# Patient Record
Sex: MALE | Race: BLACK OR AFRICAN AMERICAN | Employment: STUDENT | URBAN - METROPOLITAN AREA
[De-identification: names, ages, dates, MRNs, and addresses within clinical notes are randomized per-mention and may not be internally consistent; named-entity substitution may affect disease eponyms.]

---

## 2018-02-08 ENCOUNTER — OFFICE VISIT (OUTPATIENT)
Dept: INTERNAL MEDICINE | Facility: CLINIC | Age: 23
End: 2018-02-08
Payer: COMMERCIAL

## 2018-02-08 VITALS
DIASTOLIC BLOOD PRESSURE: 60 MMHG | WEIGHT: 138.5 LBS | HEART RATE: 86 BPM | OXYGEN SATURATION: 98 % | RESPIRATION RATE: 20 BRPM | SYSTOLIC BLOOD PRESSURE: 93 MMHG

## 2018-02-08 DIAGNOSIS — R10.32 LLQ ABDOMINAL PAIN: ICD-10-CM

## 2018-02-08 DIAGNOSIS — K62.5 RECTAL BLEEDING: Primary | ICD-10-CM

## 2018-02-08 ASSESSMENT — PAIN SCALES - GENERAL: PAINLEVEL: NO PAIN (0)

## 2018-02-08 NOTE — TELEPHONE ENCOUNTER
APPT INFO    Date /Time: 2/22/18   Reason for Appt: Rectal bleeding   Ref Provider/Clinic: [facility]   Are there internal records? Yes/No?  IF YES, list clinic names: Yes;   [facility]   Are there outside records? Yes/No? No   Patient Contact (Y/N) & Call Details: No - cc called to let us know that pt has no outside records   Action:

## 2018-02-08 NOTE — PATIENT INSTRUCTIONS
[facility]: [phone]     [facility] Medication Refill Request Information:  * Please contact your pharmacy regarding ANY request for medication refills.  ** [facility] Prescription Fax = [phone]  * Please allow 3 business days for routine medication refills.  * Please allow 5 business days for controlled substance medication refills.     [facility] Test Result notification information:  *You will be notified with in 7-10 days of your appointment day regarding the results of your test.  If you are on MyChart you will be notified as soon as the provider has reviewed the results and signed off on them.    Omeprazole/prilosec pills  Maalox liquid  Tums  Zantac tablets.   Evaluating and Treating Rectal Bleeding     As part of your evaluation, a flexible sigmoidoscopy or colonoscopy may be done. You may also have an upper endoscopy if your stools are darker.     To find the site and cause of your bleeding, you will have a physical exam. You will be asked about your health history. Tests may be done to help confirm the diagnosis and plan your treatment.  Tests you may have  Any of these procedures may be done:    Stool sample. A small amount of your stool will be checked for blood.    Anoscopy. This test uses a small tube (anoscope) to examine the anus. It checks for problems such as hemorrhoids.    Sigmoidoscopy. This test uses a lighted tube to check your rectum and the part of the large intestine that is closest to the rectum (the sigmoid colon).    Colonoscopy. This test looks at your rectum and entire colon. You may be given medicine through an IV to help you relax.    Lower GI (gastrointestinal) series, or barium enema. This is an X-ray test to view your colon. A milky liquid containing barium is passed through your rectum and into the colon. This liquid makes it easy to see your colon on the X-ray.    Upper endoscopy. This test checks your esophagus, stomach, and upper small intestine.  It is done in cases of rectal bleeding along with other symptoms like low blood pressure and rapid heartbeat. This test may also be done if your stools are dark black and tarry.    Capsule endoscopy. For this test, you swallow a pill that has a tiny camera inside. The camera takes pictures of your small intestine. It can get to areas that are hard to reach with colonoscopy and upper endoscopy.    Balloon enteroscopy. This test uses a special tube (scope) to get deep into the small intestine.    Tagged red blood cell scan. This test marks (tags) red blood cells with very small amounts of radioactive material. The cells can then be seen and tracked on a scan.    Angiography. This test threads a tube (catheter) through a vein, often in the leg. The tube injects dye into your blood vessels to see where the bleeding is taking place.  Your treatment plan  Your treatment will depend on the cause of your rectal bleeding. Your healthcare provider will create a treatment plan that s right for you. Sometimes rectal bleeding stops on its own. If it does, be sure to see your provider to check that the problem wasn t serious.  What you can do  Follow all your doctor s instructions. Keep working with your doctor after your treatment. Make and keep your follow-up visits. If you have more rectal bleeding, call your doctor. It may be a sign of the same or another health problem.   Date Last Reviewed: 7/1/2016 2000-2017 [employer]. [address] [place] [address]. All rights reserved. This information is not intended as a substitute for professional medical care. Always follow your healthcare professional's instructions.        Tips to Control Acid Reflux    To control acid reflux, you ll need to make some basic diet and lifestyle changes. The simple steps outlined below may be all you ll need to ease discomfort.  Watch what you eat    Avoid fatty foods and spicy foods.    Eat fewer acidic foods, such as  citrus and tomato-based foods. These can increase symptoms.    Limit drinking alcohol, caffeine, and fizzy beverages. All increase acid reflux.    Try limiting chocolate, peppermint, and spearmint. These can worsen acid reflux in some people.  Watch when you eat    Avoid lying down for 3 hours after eating.    Do not snack before going to bed.  Raise your head  Raising your head and upper body by 4 to 6 inches helps limit reflux when you re lying down. Put blocks under the head of your bed frame to raise it.  Other changes    Lose weight, if you need to    Don t exercise near bedtime    Avoid tight-fitting clothes    Limit aspirin and ibuprofen    Stop smoking   Date Last Reviewed: 7/1/2016 2000-2017 [employer]. [address] [place] [address], [place] [address]. All rights reserved. This information is not intended as a substitute for professional medical care. Always follow your healthcare professional's instructions.        GERD (Adult)    The esophagus is a tube that carries food from the mouth to the stomach. A valve at the lower end of the esophagus prevents stomach acid from flowing upward. When this valve doesn't work properly, stomach contents may repeatedly flow back up (reflux) into the esophagus. This is called gastroesophageal reflux disease (GERD). GERD can irritate the esophagus. It can cause problems with swallowing or breathing. In severe cases, GERD can cause recurrent pneumonia or other serious problems.  Symptoms of reflux include burning, pressure or sharp pain in the upper abdomen or mid to lower chest. The pain can spread to the neck, back, or shoulder. There may be belching, an acid taste in the back of the throat, chronic cough, or sore throat or hoarseness. GERD symptoms often occur during the day after a big meal. They can also occur at night when lying down.   Home care  Lifestyle changes can help reduce symptoms. If needed, medicines may be prescribed. Symptoms often improve  "with treatment, but if treatment is stopped, the symptoms often return after a few months. So most persons with GERD will need to continue treatment.  Lifestyle changes    Limit or avoid fatty, fried, and spicy foods, as well as coffee, chocolate, mint, and foods with high acid content such as tomatoes and citrus fruit and juices (orange, grapefruit, lemon).    Don t eat large meals, especially at night. Frequent, smaller meals are best. Do not lie down right after eating. And don t eat anything 3 hours before going to bed.    Avoid drinking alcohol and smoking. As much as possible, stay away from second hand smoke.    If you are overweight, losing weight will reduce symptoms.     Avoid wearing tight clothing around your stomach area.    If your symptoms occur during sleep, use a foam wedge to elevate your upper body (not just your head.) Or, place 4\" blocks under the head of your bed.  Medicines  If needed, medicines can help relieve the symptoms of GERD and prevent damage to the esophagus. Discuss a medicine plan with your healthcare provider. This may include one or more of the following medicines:    Antacids to help neutralize the normal acids in your stomach.    Acid blockers (H2 blockers) to decrease acid production.    Acid inhibitors (PPIs) to decrease acid production in a different way than the blockers. They may work better, but can take a little longer to take effect.  Take an antacid 30-60 minutes after eating and at bedtime, but not at the same time as an acid blocker.  Try not to take medicines such as ibuprofen and aspirin. If you are taking aspirin for your heart or other medical reasons, talk to your healthcare provider about stopping it.  Follow-up care  Follow up with your healthcare provider or as advised by our staff.  When to seek medical advice  Call your healthcare provider if any of the following occur:    Stomach pain gets worse or moves to the lower right abdomen (appendix area)    Chest " pain appears or gets worse, or spreads to the back, neck, shoulder, or arm    Frequent vomiting (can t keep down liquids)    Blood in the stool or vomit (red or black in color)    Feeling weak or dizzy    Fever of 100.4 F (38 C) or higher, or as directed by your healthcare provider  Date Last Reviewed: 6/23/2015 2000-2017 [employer]. [address] [place] [address]. All rights reserved. This information is not intended as a substitute for professional medical care. Always follow your healthcare professional's instructions.

## 2018-02-08 NOTE — PROGRESS NOTES
[facility]  Primary Care Center   DAVID Kiran CNP  2/8/2018     Chief Complaint:   Establish Care (establish care/provider-physical).     History of Present Illness:   Dayami Clemons is a 22 year old male with a history of GERD who presents to [facility] care with a primary care provider and discuss a few issues. The patient came to the [place] from [place] [facility] in 2017. He had medical care in [place] [facility]. He denies any exposure to malaria.     Bloody diarrhea and abdominal pain: The patient describes intermittent episodes of bloody diarrhea beginning approximately 6 months ago with associated left upper abdominal cramping. He indicates the episodes are not consistent, noting they typically occur after he eats very spicy  or [place]o food, as well as after eating any greasy foods. Occasionally, he notices he burps more frequently when the pain is present. When the cramping occurs, he reports it stays localized to his left upper quadrant and does not radiate. The pain occasionally makes him dizzy and anxious and typically lasts roughly two hours prior to resolving. The blood is typically bright red and appears on the toilet paper when he wipes after a hard bowel movement, or if he is experiencing diarrhea. Sometimes he notices anal itching after passing a bowel movement. He denies noting any anal protrusions or swelling. Passing a bowel movement is not painful for him. He denies any changes in appetite. The last episode of pain and bloody diarrhea occurred in August of 2017.     He has been able to gain weight by working out and taking protein powder supplements since arriving in  spring of 2017 to start school at [employer].      He has travelled to [place] other than back to [place] [facility], and here.      Review of Systems:   10 system ROS reviewed w/o changes except for above     Active Medications:      PANTOPRAZOLE SODIUM PO, Take 20 mg by mouth as needed for heartburn, Disp: , Rfl:        Allergies:   Dust mites.      Past Medical History:  The patient has a past medical history significant for GERD.      Past Surgical History:  The patient has a history of left knee meniscectomy in 2015 in [place].     Family History:   No past pertinent family history.      Social History:   The patient is a former smoker. He denies alcohol use. He is currently taking college courses and would like to become a  in the future.      Physical Exam:   BP 93/60 (BP Location: Right arm, Patient Position: Sitting, Cuff Size: Adult Regular)  Pulse 86  Resp 20  Wt 62.8 kg (138 lb 8 oz)  SpO2 98%   Wt Readings from Last 1 Encounters:   02/08/18 62.8 kg (138 lb 8 oz)   Constitutional: no distress, comfortable, pleasant   Eyes: anicteric  Cardiovascular: regular rate and rhythm, normal S1 and S2  Respiratory: clear to auscultation, no wheezes or crackles, normal breath sounds   Gastrointestinal: positive bowel sounds, nontender, no hepatosplenomegaly, no masses.  His abdomen is firm.    Rectal exam: negative for hemorrhoids or obvious fissure.  I attempted to pass anoscope without success due to discomfort on the part of the patient.   Musculoskeletal: full range of motion, no edema   Skin: no concerning lesions, no jaundice, temp normal   Neurological:  normal gait, normal speech, no tremor. A and O x 3, good historian   Psychological: appropriate mood, good eye contact, normal affect        Assessment and Plan:  Rectal bleeding and left upper quadrant abdominal pain: We discussed the possible etiologies for his symptoms, including an anal fissure or hemorrhoid. I was unable to visualize a fissure or external hemorrhoid on examination today. We will obtain stool samples to evaluate for a possible parasite given the amount of traveling he has done. I have additionally recommended he follow up with a colorectal specialist for further evaluation and he was in agreement with this plan. All questions were answered.    He brought up the possibility of colitis.  I explained that [facility] could explore this with him but it did not seem likely.   - COLORECTAL SURGERY REFERRAL  - [facility] OVA AND PARASITES    Heartburn: I have recommended trying omeprazole on a daily basis to help limit acid production. We additionally discussed using Maalox, Tums, or Zantac for symptomatic relief. He was provided with an information handout regarding gastric reflux disease.      Follow-up: He will follow up on an as needed basis.          Scribe Disclosure:   I, Alexis Duong, am serving as a scribe to document services personally performed by DAVID Kiran CNP at this visit, based upon the provider's statements to me. All documentation has been reviewed by the aforementioned provider prior to being entered into the official medical record.     Portions of this medical record were completed by a scribe. UPON MY REVIEW AND AUTHENTICATION BY ELECTRONIC SIGNATURE, this confirms (a) I performed the applicable clinical services, and (b) the record is accurate.    Total time spent 30  minutes.  More than 50% of the time spent with Mr. Clemons on counseling / coordinating his care

## 2018-02-08 NOTE — MR AVS SNAPSHOT
After Visit Summary   2/8/2018    Dayami Clemons    MRN: [id number]           Patient Information     Date Of Birth          1995        Visit Information        Provider Department      2/8/2018 12:00 PM Ruth Jeff, APRN CNP [facility]        Today's Diagnoses     Rectal bleeding    -  1    LLQ abdominal pain          Care Instructions    Primary Care Center: [phone]     Primary Care Center Medication Refill Request Information:  * Please contact your pharmacy regarding ANY request for medication refills.  ** [facility] Prescription Fax = [phone]  * Please allow 3 business days for routine medication refills.  * Please allow 5 business days for controlled substance medication refills.     Primary Care Center Test Result notification information:  *You will be notified with in 7-10 days of your appointment day regarding the results of your test.  If you are on MyChart you will be notified as soon as the provider has reviewed the results and signed off on them.    Omeprazole/prilosec pills  Maalox liquid  Tums  Zantac tablets.   Evaluating and Treating Rectal Bleeding     As part of your evaluation, a flexible sigmoidoscopy or colonoscopy may be done. You may also have an upper endoscopy if your stools are darker.     To find the site and cause of your bleeding, you will have a physical exam. You will be asked about your health history. Tests may be done to help confirm the diagnosis and plan your treatment.  Tests you may have  Any of these procedures may be done:    Stool sample. A small amount of your stool will be checked for blood.    Anoscopy. This test uses a small tube (anoscope) to examine the anus. It checks for problems such as hemorrhoids.    Sigmoidoscopy. This test uses a lighted tube to check your rectum and the part of the large intestine that is closest to the rectum (the sigmoid colon).    Colonoscopy. This test looks at your rectum and entire  colon. You may be given medicine through an IV to help you relax.    Lower GI (gastrointestinal) series, or barium enema. This is an X-ray test to view your colon. A milky liquid containing barium is passed through your rectum and into the colon. This liquid makes it easy to see your colon on the X-ray.    Upper endoscopy. This test checks your esophagus, stomach, and upper small intestine. It is done in cases of rectal bleeding along with other symptoms like low blood pressure and rapid heartbeat. This test may also be done if your stools are dark black and tarry.    Capsule endoscopy. For this test, you swallow a pill that has a tiny camera inside. The camera takes pictures of your small intestine. It can get to areas that are hard to reach with colonoscopy and upper endoscopy.    Balloon enteroscopy. This test uses a special tube (scope) to get deep into the small intestine.    Tagged red blood cell scan. This test marks (tags) red blood cells with very small amounts of radioactive material. The cells can then be seen and tracked on a scan.    Angiography. This test threads a tube (catheter) through a vein, often in the leg. The tube injects dye into your blood vessels to see where the bleeding is taking place.  Your treatment plan  Your treatment will depend on the cause of your rectal bleeding. Your healthcare provider will create a treatment plan that s right for you. Sometimes rectal bleeding stops on its own. If it does, be sure to see your provider to check that the problem wasn t serious.  What you can do  Follow all your doctor s instructions. Keep working with your doctor after your treatment. Make and keep your follow-up visits. If you have more rectal bleeding, call your doctor. It may be a sign of the same or another health problem.   Date Last Reviewed: 7/1/2016 2000-2017 [employer]. [address] [place] [address], [place] [address]. All rights reserved. This information is not intended as  a substitute for professional medical care. Always follow your healthcare professional's instructions.        Tips to Control Acid Reflux    To control acid reflux, you ll need to make some basic diet and lifestyle changes. The simple steps outlined below may be all you ll need to ease discomfort.  Watch what you eat    Avoid fatty foods and spicy foods.    Eat fewer acidic foods, such as citrus and tomato-based foods. These can increase symptoms.    Limit drinking alcohol, caffeine, and fizzy beverages. All increase acid reflux.    Try limiting chocolate, peppermint, and spearmint. These can worsen acid reflux in some people.  Watch when you eat    Avoid lying down for 3 hours after eating.    Do not snack before going to bed.  Raise your head  Raising your head and upper body by 4 to 6 inches helps limit reflux when you re lying down. Put blocks under the head of your bed frame to raise it.  Other changes    Lose weight, if you need to    Don t exercise near bedtime    Avoid tight-fitting clothes    Limit aspirin and ibuprofen    Stop smoking   Date Last Reviewed: 7/1/2016 2000-2017 [employer]. [address] [place] [address]. All rights reserved. This information is not intended as a substitute for professional medical care. Always follow your healthcare professional's instructions.        GERD (Adult)    The esophagus is a tube that carries food from the mouth to the stomach. A valve at the lower end of the esophagus prevents stomach acid from flowing upward. When this valve doesn't work properly, stomach contents may repeatedly flow back up (reflux) into the esophagus. This is called gastroesophageal reflux disease (GERD). GERD can irritate the esophagus. It can cause problems with swallowing or breathing. In severe cases, GERD can cause recurrent pneumonia or other serious problems.  Symptoms of reflux include burning, pressure or sharp pain in the upper abdomen or mid to lower  "chest. The pain can spread to the neck, back, or shoulder. There may be belching, an acid taste in the back of the throat, chronic cough, or sore throat or hoarseness. GERD symptoms often occur during the day after a big meal. They can also occur at night when lying down.   Home care  Lifestyle changes can help reduce symptoms. If needed, medicines may be prescribed. Symptoms often improve with treatment, but if treatment is stopped, the symptoms often return after a few months. So most persons with GERD will need to continue treatment.  Lifestyle changes    Limit or avoid fatty, fried, and spicy foods, as well as coffee, chocolate, mint, and foods with high acid content such as tomatoes and citrus fruit and juices (orange, grapefruit, lemon).    Don t eat large meals, especially at night. Frequent, smaller meals are best. Do not lie down right after eating. And don t eat anything 3 hours before going to bed.    Avoid drinking alcohol and smoking. As much as possible, stay away from second hand smoke.    If you are overweight, losing weight will reduce symptoms.     Avoid wearing tight clothing around your stomach area.    If your symptoms occur during sleep, use a foam wedge to elevate your upper body (not just your head.) Or, place 4\" blocks under the head of your bed.  Medicines  If needed, medicines can help relieve the symptoms of GERD and prevent damage to the esophagus. Discuss a medicine plan with your healthcare provider. This may include one or more of the following medicines:    Antacids to help neutralize the normal acids in your stomach.    Acid blockers (H2 blockers) to decrease acid production.    Acid inhibitors (PPIs) to decrease acid production in a different way than the blockers. They may work better, but can take a little longer to take effect.  Take an antacid 30-60 minutes after eating and at bedtime, but not at the same time as an acid blocker.  Try not to take medicines such as ibuprofen and " aspirin. If you are taking aspirin for your heart or other medical reasons, talk to your healthcare provider about stopping it.  Follow-up care  Follow up with your healthcare provider or as advised by our staff.  When to seek medical advice  Call your healthcare provider if any of the following occur:    Stomach pain gets worse or moves to the lower right abdomen (appendix area)    Chest pain appears or gets worse, or spreads to the back, neck, shoulder, or arm    Frequent vomiting (can t keep down liquids)    Blood in the stool or vomit (red or black in color)    Feeling weak or dizzy    Fever of 100.4 F (38 C) or higher, or as directed by your healthcare provider  Date Last Reviewed: 6/23/2015 2000-2017 [employer]. [address] [place] [address], [place] [address]. All rights reserved. This information is not intended as a substitute for professional medical care. Always follow your healthcare professional's instructions.                  Follow-ups after your visit        Additional Services     COLORECTAL SURGERY REFERRAL       Your provider has referred you to: [facility]: Colon and Rectal Surgery Clinic [facility] [phone]   [email or web address]    Referral Reason(s): bleeding from anus after passing stool.  Anal itching after passing stool and seeing [facility] Please check for anal Fissure.  He has a hx of hard stools and diarrhea I the past.    Also c/o discomfort in the left upper quadrant after eating spicy foods. He is questioning colitis.   Special Concerns: None  This referral is: Elective (week +)  It is not OK to leave a message on patient's voicemail.    Please be aware that coverage of these services is subject to the terms and limitations of your health insurance plan.  Call member services at your health plan with any benefit or coverage questions.      Please bring the following with you to your appointment:    (1) Any X-Rays, CTs or MRIs  which have been performed.  Contact the facility where they were done to arrange for  prior to your scheduled appointment.    (2) List of current medications  (3) This referral request   (4) Any documents/labs given to you for this referral                  Your next 10 appointments already scheduled     2018  7:15 AM CST   (Arrive by 7:00 AM)   New Patient Visit with DAVID Ribera [facility] Colon and Rectal Surgery ([facility] [place])    [address] [place] [address]  4th [facility] [address] [phone]              Who to contact     Please call your clinic at [phone] to:    Ask questions about your health    Make or cancel appointments    Discuss your medicines    Learn about your test results    Speak to your doctor            Additional Information About Your Visit        MyChart Information     [employer] is an electronic gateway that provides easy, online access to your medical records. With [employer], you can request a clinic appointment, read your test results, renew a prescription or communicate with your care team.     To sign up for [employer] visit the website at [email or web address]   You will be asked to enter the access code listed below, as well as some personal information. Please follow the directions to create your username and password.     Your access code is: [email or web address]  Expires: 2018  6:30 AM     Your access code will  in 90 days. If you need help or a new code, please contact your [facility] or call [phone] for assistance.        Care EveryWhere ID     This is your Care EveryWhere ID. This could be used by other organizations to access your [facility] medical records  [id number]        Your Vitals Were     Pulse Respirations Pulse Oximetry             86 20 98%          Blood Pressure from Last 3 Encounters:   18 93/60    Weight from Last 3 Encounters:   18 62.8  kg (138 lb 8 oz)              We Performed the Following     COLORECTAL SURGERY REFERRAL     HCL OVA AND PARASITES        Primary Care Provider Office Phone # Fax #    DAVID Condon [phone]       [address] [place] [address] 741  [facility] [address]        Equal Access to Services     HEATHER ROBERSON : Hadii aad ku hadasho Soomaali, waaxda luqadaha, qaybta kaalmada adeegyada, waxay idiin hayaan aderuperto kharalexie lafrieda . So [facility] [phone].    ATENCIÓN: Si habla español, tiene a dejesus disposición servicios gratuitos de asistencia lingüística. [facility] [phone].    We comply with applicable federal civil rights laws and [place] laws. We do not discriminate on the basis of race, color, national origin, age, disability, sex, sexual orientation, or gender identity.            Thank you!     Thank you for choosing [facility]  for your care. Our goal is always to provide you with excellent care. Hearing back from our patients is one way we can continue to improve our services. Please take a few minutes to complete the written survey that you may receive in the mail after your visit with us. Thank you!             Your Updated Medication List - Protect others around you: Learn how to safely use, store and throw away your medicines at www.disposemymeds.org.          This list is accurate as of 2/8/18  1:32 PM.  Always use your most recent med list.                   Brand Name Dispense Instructions for use Diagnosis    PANTOPRAZOLE SODIUM PO      Take 20 mg by mouth as needed for heartburn

## 2018-02-22 ENCOUNTER — PRE VISIT (OUTPATIENT)
Dept: SURGERY | Facility: CLINIC | Age: 23
End: 2018-02-22

## 2018-03-07 ENCOUNTER — OFFICE VISIT - HEALTHEAST (OUTPATIENT)
Dept: INTERNAL MEDICINE | Facility: CLINIC | Age: 23
End: 2018-03-07

## 2018-03-07 DIAGNOSIS — K59.04 CHRONIC IDIOPATHIC CONSTIPATION: ICD-10-CM

## 2018-03-07 DIAGNOSIS — Z00.00 PREVENTATIVE HEALTH CARE: ICD-10-CM

## 2018-03-07 LAB
ALBUMIN SERPL-MCNC: 4.6 G/DL (ref 3.5–5)
ALP SERPL-CCNC: 65 U/L (ref 45–120)
ALT SERPL W P-5'-P-CCNC: 17 U/L (ref 0–45)
ANION GAP SERPL CALCULATED.3IONS-SCNC: 10 MMOL/L (ref 5–18)
AST SERPL W P-5'-P-CCNC: 18 U/L (ref 0–40)
BILIRUB SERPL-MCNC: 0.8 MG/DL (ref 0–1)
BUN SERPL-MCNC: 13 MG/DL (ref 8–22)
CALCIUM SERPL-MCNC: 9.9 MG/DL (ref 8.5–10.5)
CHLORIDE BLD-SCNC: 102 MMOL/L (ref 98–107)
CHOLEST SERPL-MCNC: 167 MG/DL
CO2 SERPL-SCNC: 28 MMOL/L (ref 22–31)
CREAT SERPL-MCNC: 1.14 MG/DL (ref 0.7–1.3)
ERYTHROCYTE [DISTWIDTH] IN BLOOD BY AUTOMATED COUNT: 11.2 % (ref 11–14.5)
FASTING STATUS PATIENT QL REPORTED: YES
GFR SERPL CREATININE-BSD FRML MDRD: >60 ML/MIN/1.73M2
GLUCOSE BLD-MCNC: 96 MG/DL (ref 70–125)
HCT VFR BLD AUTO: 48.4 % (ref 40–54)
HDLC SERPL-MCNC: 62 MG/DL
HGB BLD-MCNC: 17 G/DL (ref 14–18)
LDLC SERPL CALC-MCNC: 92 MG/DL
MCH RBC QN AUTO: 30.4 PG (ref 27–34)
MCHC RBC AUTO-ENTMCNC: 35 G/DL (ref 32–36)
MCV RBC AUTO: 87 FL (ref 80–100)
PLATELET # BLD AUTO: 263 THOU/UL (ref 140–440)
PMV BLD AUTO: 7.4 FL (ref 7–10)
POTASSIUM BLD-SCNC: 4 MMOL/L (ref 3.5–5)
PROT SERPL-MCNC: 8.2 G/DL (ref 6–8)
RBC # BLD AUTO: 5.58 MILL/UL (ref 4.4–6.2)
SODIUM SERPL-SCNC: 140 MMOL/L (ref 136–145)
TRIGL SERPL-MCNC: 64 MG/DL
TSH SERPL DL<=0.005 MIU/L-ACNC: 2.05 UIU/ML (ref 0.3–5)
WBC: 4 THOU/UL (ref 4–11)

## 2018-03-07 ASSESSMENT — MIFFLIN-ST. JEOR: SCORE: 1580.47

## 2018-03-08 ENCOUNTER — COMMUNICATION - HEALTHEAST (OUTPATIENT)
Dept: INTERNAL MEDICINE | Facility: CLINIC | Age: 23
End: 2018-03-08

## 2018-03-08 LAB — 25(OH)D3 SERPL-MCNC: 48.7 NG/ML (ref 30–80)

## 2018-03-12 ENCOUNTER — COMMUNICATION - HEALTHEAST (OUTPATIENT)
Dept: INTERNAL MEDICINE | Facility: CLINIC | Age: 23
End: 2018-03-12

## 2018-05-24 ENCOUNTER — OFFICE VISIT - HEALTHEAST (OUTPATIENT)
Dept: INTERNAL MEDICINE | Facility: CLINIC | Age: 23
End: 2018-05-24

## 2018-05-24 DIAGNOSIS — M25.569 KNEE PAIN: ICD-10-CM

## 2018-06-05 ENCOUNTER — RECORDS - HEALTHEAST (OUTPATIENT)
Dept: ADMINISTRATIVE | Facility: OTHER | Age: 23
End: 2018-06-05

## 2018-07-26 ENCOUNTER — RECORDS - HEALTHEAST (OUTPATIENT)
Dept: ADMINISTRATIVE | Facility: OTHER | Age: 23
End: 2018-07-26

## 2018-07-31 ENCOUNTER — RECORDS - HEALTHEAST (OUTPATIENT)
Dept: ADMINISTRATIVE | Facility: OTHER | Age: 23
End: 2018-07-31

## 2018-08-07 ENCOUNTER — RECORDS - HEALTHEAST (OUTPATIENT)
Dept: ADMINISTRATIVE | Facility: OTHER | Age: 23
End: 2018-08-07

## 2018-08-09 NOTE — TELEPHONE ENCOUNTER
FUTURE VISIT INFORMATION      FUTURE VISIT INFORMATION:    Date: 8/10    Time: 11:00    Location:   REFERRAL INFORMATION:    Referring provider:  SELF    Referring providers clinic:      Reason for visit/diagnosis : Lt knee pain        RECORDS STATUS      Patient will hand carry MRI disk

## 2018-08-10 ENCOUNTER — OFFICE VISIT (OUTPATIENT)
Dept: ORTHOPEDICS | Facility: CLINIC | Age: 23
End: 2018-08-10
Payer: COMMERCIAL

## 2018-08-10 ENCOUNTER — PRE VISIT (OUTPATIENT)
Dept: ORTHOPEDICS | Facility: CLINIC | Age: 23
End: 2018-08-10

## 2018-08-10 VITALS — BODY MASS INDEX: 20.73 KG/M2 | WEIGHT: 140 LBS | HEIGHT: 69 IN

## 2018-08-10 DIAGNOSIS — M25.562 ARTHRALGIA OF LEFT LOWER LEG: Primary | ICD-10-CM

## 2018-08-10 NOTE — LETTER
"  8/10/2018      RE: Dayami Clemons  [address] [place] [address]       [facility]  Orthopedics  Brian Glaser MD  08/10/2018     Name: Dayami Clemons  MRN: [id number]  Age: 22 year old  : 1995  Referring provider: Referred Self     Chief Complaint: Left knee pain     History of Present Illness:   Dayami Clemons is a 22 year old male with a history of left medial meniscus surgery in 2016 who presents today for evaluation of left knee pain. 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 He notes that he had been doing yoga and with his knee in a flexed position he began to have left knee discomfort. He went to his chiropractors who gave exercises and referral to physical therapy. He did this for about 2 months and was seeing improvement. However, while training for soccer he began to have intermittent pain with cutting and kicking. He had MRI with [facility] who said to him he may have some bone bruising or slight meniscal tear. Today, he reports that his pain is usually to the lateral aspect. There has been no swelling.     Review of Systems:   A 14-point review of systems was obtained and is negative except for as noted in the HPI.     Medications:     Current Outpatient Prescriptions:      PANTOPRAZOLE SODIUM PO, Take 20 mg by mouth as needed for heartburn, Disp: , Rfl:     Allergies:  Dust mites     Past Medical History:  The patient does not have any past pertinent medical history.     Past Surgical History:  Right medial meniscus surgery 2016     Social History:  Full time soccer play     Family History:  Negative for bleeding or clotting disorders or adverse reactions to anesthesia.    Physical Examination:  Height 1.753 m (5' 9\"), weight 63.5 kg (140 lb).   General: Alert, oriented, no distress.  Skin: Cool to touch without erythema, ecchymosis, or lesions. No dystrophic changes.  Neuro: Neurovascularly intact distally. " Sensation intact to light touch.  Cardiovascular: Capillary refill brisk.  Left Knee: No effusion. Range of motion from 7 degrees of hyperextension to 140 degrees of flexion on the right, and 7 to 140 on the left. Non -tender along the medial or lateral joint line. Negative Lachman s. Negative pivot shift. Stable to varus and valgus stress. No posterior drawer. Negative Junie's.     Assessment:   22 year old male with a history of right medial meniscus surgery in 2016 who presents with an MRI form [facility] with possible meniscal tear and bone bruising. We had a lengthy discussion on reasons to observe his symptoms as his knee is in good condition and he is able to play soccer. However, I did explain the process of obtaining an arthroscopy and that this would likely keep him out of soccer for at least 6 weeks. He also inquired about a PRP injection and I discussed the risks and benefits of this.     Diagnosis: Left lateral knee pain concerning for possible small lateral meniscus tear      Plan:   1. Will contact us as needed for either a PRP injection or arthroscopy    Scribe Disclosure:   I, Ricky العراقي, am serving as a scribe to document services personally performed by Brian Glaser MD at this visit, based upon the provider's statements to me. All documentation has been reviewed by the aforementioned provider prior to being entered into the official medical record.     Portions of this medical record were completed by a scribe. UPON MY REVIEW AND AUTHENTICATION BY ELECTRONIC SIGNATURE, this confirms (a) I performed the applicable clinical services, and (b) the record is accurate.     Brian Glaser MD

## 2018-08-10 NOTE — NURSING NOTE
"Reason For Visit:   Chief Complaint   Patient presents with     Left Knee - Pain     Semi-pro .     ?  No  Occupation: Student at [employer]    Date of injury: NA  Type of injury: No pain, complains of certain moves that are difficult such as cutting  Date of surgery: 2016  Type of surgery: Meniscus Surgery   Smoker: No  Request smoking cessation information: No    Pain Assessment  Patient Currently in Pain: No    Ht 5' 9\" (1.753 m)  Wt 140 lb (63.5 kg)  BMI 20.67 kg/m2         Allergies   Allergen Reactions     Dust Mites      Sinus problems.Rosie Ewing LPN 12:31 PM on 2/8/2018       Current Outpatient Prescriptions   Medication     PANTOPRAZOLE SODIUM PO     No current facility-administered medications for this visit.            Carley Faulkner, ATC    "

## 2018-08-10 NOTE — PROGRESS NOTES
"[facility]  Orthopedics  Brian Glaser MD  08/10/2018     Name: Dayami Clemons  MRN: [id number]  Age: 22 year old  : 1995  Referring provider: Referred Self     Chief Complaint: Left knee pain     History of Present Illness:   Dayami Clemons is a 22 year old male with a history of left medial meniscus surgery in 2016 who presents today for evaluation of left knee pain. 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 He notes that he had been doing yoga and with his knee in a flexed position he began to have left knee discomfort. He went to his chiropractors who gave exercises and referral to physical therapy. He did this for about 2 months and was seeing improvement. However, while training for soccer he began to have intermittent pain with cutting and kicking. He had MRI with [facility] who said to him he may have some bone bruising or slight meniscal tear. Today, he reports that his pain is usually to the lateral aspect. There has been no swelling.     Review of Systems:   A 14-point review of systems was obtained and is negative except for as noted in the HPI.     Medications:     Current Outpatient Prescriptions:      PANTOPRAZOLE SODIUM PO, Take 20 mg by mouth as needed for heartburn, Disp: , Rfl:     Allergies:  Dust mites     Past Medical History:  The patient does not have any past pertinent medical history.     Past Surgical History:  Right medial meniscus surgery 2016     Social History:  Full time soccer play     Family History:  Negative for bleeding or clotting disorders or adverse reactions to anesthesia.    Physical Examination:  Height 1.753 m (5' 9\"), weight 63.5 kg (140 lb).   General: Alert, oriented, no distress.  Skin: Cool to touch without erythema, ecchymosis, or lesions. No dystrophic changes.  Neuro: Neurovascularly intact distally. Sensation intact to light touch.  Cardiovascular: Capillary refill brisk.  Left Knee: No " effusion. Range of motion from 7 degrees of hyperextension to 140 degrees of flexion on the right, and 7 to 140 on the left. Non -tender along the medial or lateral joint line. Negative Lachman s. Negative pivot shift. Stable to varus and valgus stress. No posterior drawer. Negative Junie's.     Assessment:   22 year old male with a history of right medial meniscus surgery in 2016 who presents with an MRI form [facility] with possible meniscal tear and bone bruising. We had a lengthy discussion on reasons to observe his symptoms as his knee is in good condition and he is able to play soccer. However, I did explain the process of obtaining an arthroscopy and that this would likely keep him out of soccer for at least 6 weeks. He also inquired about a PRP injection and I discussed the risks and benefits of this.     Diagnosis: Left lateral knee pain concerning for possible small lateral meniscus tear      Plan:   1. Will contact us as needed for either a PRP injection or arthroscopy    Scribe Disclosure:   I, Ricky العراقي, am serving as a scribe to document services personally performed by Brian Glaser MD at this visit, based upon the provider's statements to me. All documentation has been reviewed by the aforementioned provider prior to being entered into the official medical record.     Portions of this medical record were completed by a scribe. UPON MY REVIEW AND AUTHENTICATION BY ELECTRONIC SIGNATURE, this confirms (a) I performed the applicable clinical services, and (b) the record is accurate.

## 2018-08-10 NOTE — MR AVS SNAPSHOT
"              After Visit Summary   8/10/2018    Dayami Clemons    MRN: [id number]           Patient Information     Date Of Birth          1995        Visit Information        Provider Department      8/10/2018 11:00 AM Brian Glaser MD [facility]        Today's Diagnoses     Arthralgia of left lower leg    -  1       Follow-ups after your visit        Who to contact     Please call your clinic at [phone] to:    Ask questions about your health    Make or cancel appointments    Discuss your medicines    Learn about your test results    Speak to your doctor            Additional Information About Your Visit        MyChart Information     Railpod is an electronic gateway that provides easy, online access to your medical records. With Railpod, you can request a clinic appointment, read your test results, renew a prescription or communicate with your care team.     To sign up for [employer] visit the website at [email or web address]   You will be asked to enter the access code listed below, as well as some personal information. Please follow the directions to create your username and password.     Your access code is: [email or web address]  Expires: 2018  6:30 AM     Your access code will  in 90 days. If you need help or a new code, please contact your [facility] or call [phone] for assistance.        Care EveryWhere ID     This is your Care EveryWhere ID. This could be used by other organizations to access your [facility] medical records  [id number]        Your Vitals Were     Height BMI (Body Mass Index)                5' 9\" (1.753 m) 20.67 kg/m2           Blood Pressure from Last 3 Encounters:   18 93/60    Weight from Last 3 Encounters:   08/10/18 140 lb (63.5 kg)   18 138 lb 8 oz (62.8 kg)              Today, you had the following     No orders found for display       Primary Care Provider Office Phone # Fax #    Ruth CAVAZOS " Tomer, APRN -[phone]       [address] [place] [address] [phone]  [facility] [address]        Equal Access to Services     HEATHER ROBERSON : Hadii aad ku hadmaurisiomatias Aden, shylathong gonzalesaaronha, subhash kavaleriyda mohsenezra, sarahy cobyin hayaakelly connollyruperto wolflexie esposito. So [facility] [phone].    ATENCIÓN: Si habla español, tiene a dejesus disposición servicios gratuitos de asistencia lingüística. Llame al [phone].    We comply with applicable federal civil rights laws and [place] laws. We do not discriminate on the basis of race, color, national origin, age, disability, sex, sexual orientation, or gender identity.            Thank you!     Thank you for choosing [facility]  for your care. Our goal is always to provide you with excellent care. Hearing back from our patients is one way we can continue to improve our services. Please take a few minutes to complete the written survey that you may receive in the mail after your visit with us. Thank you!             Your Updated Medication List - Protect others around you: Learn how to safely use, store and throw away your medicines at www.disposemymeds.org.          This list is accurate as of 8/10/18 11:59 PM.  Always use your most recent med list.                   Brand Name Dispense Instructions for use Diagnosis    PANTOPRAZOLE SODIUM PO      Take 20 mg by mouth as needed for heartburn

## 2019-05-16 ENCOUNTER — COMMUNICATION - HEALTHEAST (OUTPATIENT)
Dept: INTERNAL MEDICINE | Facility: CLINIC | Age: 24
End: 2019-05-16

## 2021-02-16 ENCOUNTER — OFFICE VISIT (OUTPATIENT)
Dept: FAMILY MEDICINE | Facility: CLINIC | Age: 26
End: 2021-02-16
Payer: COMMERCIAL

## 2021-02-16 VITALS
SYSTOLIC BLOOD PRESSURE: 120 MMHG | WEIGHT: 137.5 LBS | TEMPERATURE: 98.1 F | BODY MASS INDEX: 20.31 KG/M2 | OXYGEN SATURATION: 97 % | HEART RATE: 67 BPM | DIASTOLIC BLOOD PRESSURE: 78 MMHG

## 2021-02-16 DIAGNOSIS — D36.9 BENIGN NEOPLASM: ICD-10-CM

## 2021-02-16 DIAGNOSIS — K59.01 SLOW TRANSIT CONSTIPATION: Primary | ICD-10-CM

## 2021-02-16 PROCEDURE — 99203 OFFICE O/P NEW LOW 30 MIN: CPT | Performed by: NURSE PRACTITIONER

## 2021-02-16 RX ORDER — POLYETHYLENE GLYCOL 3350 17 G/17G
17 POWDER, FOR SOLUTION ORAL 2 TIMES DAILY
Qty: 510 G | Refills: 1 | Status: SHIPPED | OUTPATIENT
Start: 2021-02-16

## 2021-02-16 RX ORDER — DOCUSATE SODIUM 100 MG/1
100 CAPSULE, LIQUID FILLED ORAL 2 TIMES DAILY
Qty: 120 CAPSULE | Refills: 1 | Status: SHIPPED | OUTPATIENT
Start: 2021-02-16

## 2021-02-16 NOTE — PROGRESS NOTES
Assessment & Plan     Slow transit constipation  Discussed watching his food choices to be more balanced to help reduce his constipation incidences.  - XR Abdomen 2 Views; Future  - polyethylene glycol (MIRALAX) 17 GM/Dose powder; Take 17 g by mouth 2 times daily  - docusate sodium (COLACE) 100 MG capsule; Take 1 capsule (100 mg) by mouth 2 times daily    Benign neoplasm  Hard lump to the right of his head. No other presenting or accompanying symptoms.   - US Head Neck Soft Tissue; Future  956}     Patient Instructions   - Abdominal X-Ray ordered. Head ultrasound ordered - [phone]  - Take probiotics daily.   - Start miralax twice per day to help with the constipation.   - Daily colace as a stool softener.       Patient Education     Eating a High-Fiber Diet  Fiber is what gives strength and structure to plants. Most grains, beans, vegetables, and fruits contain fiber. Foods rich in fiber are often low in calories and fat, and they fill you up more. They may also reduce your risks for certain health problems. To find out the amount of fiber in canned, packaged, or frozen foods, read the Nutrition Facts label. It tells you how much fiber is in one serving.     Types of fiber and their benefits  There are two types of fiber: insoluble and soluble. They both aid digestion and help you maintain a healthy weight.     Insoluble fiber. This is found in whole grains, cereals, certain fruits and vegetables such as apple skin, corn, and carrots. Insoluble fiber may prevent constipation and reduce the risk for certain types of cancer. It's called insoluble because it doesn't dissolve in water.    Soluble fiber. This type of fiber is in oats, beans, and certain fruits and vegetables such as strawberries and peas. Soluble fiber can reduce cholesterol, which may help lower the risk for heart disease. It also helps control blood sugar levels.  Look for high-fiber foods  Try these foods to add fiber to your  diet:    Whole-grain breads and cereals. Try to eat 6 to 8 ounces a day. Include wheat and oat bran cereals, whole-wheat muffins or toast, and corn tortillas in your meals.    Fruits. Try to eat 2 cups a day. Apples, oranges, strawberries, pears, and bananas are good sources. (Note: Fruit juice is low in fiber.)    Vegetables. Try to eat at least 2.5 cups a day. Add asparagus, carrots, broccoli, peas, and corn to your meals.    Beans. One cup of cooked lentils gives you over 15 grams of fiber. Try navy beans, lentils, and chickpeas.    Seeds. A small handful of seeds gives you about 3 grams of fiber. Try sunflower or delbert seeds.  Keep track of your fiber  Keep track of how much fiber you eat. Start by reading food labels. Then eat a variety of foods high in fiber. As you start to eat more fiber, ask your healthcare provider how much water you should be drinking to keep your digestive system working smoothly.   Aim for a certain amount of fiber in your diet each day. The daily value for fiber is 25 grams a day. But some experts advise that women under 50 eat 25 to 28 grams per day and that men under 50 eat 30 to 38 grams per day. After age 50, your daily fiber needs drop to 22 grams for women and 28 grams for men.   Before you reach for the fiber supplements, think about this. Fiber is found naturally in healthy whole foods. It gives you that feeling of fullness after you eat. Taking fiber supplements or eating fiber-enriched foods will not give you this full feeling.   Your fiber intake is a good measure for the quality of your overall diet. If you are missing out on your daily amount of fiber, you may be lacking other important nutrients as well.   [employer] last reviewed this educational content on 7/1/2019 2000-2020 [employer], [address]. [address] [place] [address], [place] [address]. All rights reserved. This information is not intended as a substitute for professional medical care. Always follow your  healthcare professional's instructions.             No follow-ups on file.    DAVID Gonzalez CNP  [facility] SHIELA Stock is a 25 year old who presents for the following health issues:   HPI     Stomach Issues  Onset/Duration: since 2018 but comes and goes. More than a week now of feeling constipated and Unable to pass stool without having intervention. Reports that he is drinking enough water, but not eating enough fruits and vegetable. Patient notes that with his keto diet- more protein foods, he has more issues with the constipation.    Description:   Character: no pain   Location: hard to pass stool out   Radiation: None  Intensity: mild, moderate  Progression of Symptoms:  improving and constant  Accompanying Signs & Symptoms:  Fever/Chills: no  Gas/Bloating: YES, a lot   Nausea: no  Vomitting: no  Diarrhea: no   Constipation: YES  Dysuria or Hematuria: no; no blood in the stool.   History:   Trauma: no  Previous similar pain: YES- back in 2018.   Previous tests done: none  Precipitating factors:   Does the pain change with:     Food: YES    Bowel Movement: YES- hard to pass stool     Urination: no   Other factors:  Not sure   Therapies tried and outcome: OTC softeners, drinks tumeric.Takes probiotics everyday.     Lump: Patient with a hard lump to his right head and concerned about this. Previously not noticed due to his long curly hair. More visible now with shorter hair. Gets caught on his hair taylor. Patient denies any pain to the area, or increase in headaches. No vision changes or other symptoms. Patient would like imaging to the area now that it's more visible to him.     Review of Systems   Constitutional, HEENT, cardiovascular, pulmonary, gi and gu systems are negative, except as otherwise noted.      Objective    /78   Pulse 67   Temp 98.1  F (36.7  C) (Temporal)   Wt 62.4 kg (137 lb 8 oz)   SpO2 97%   BMI 20.31 kg/m    Body mass index  is 20.31 kg/m .  Physical Exam   GENERAL: healthy, alert and no distress  EYES: Eyes grossly normal to inspection, PERRL and conjunctivae and sclerae normal  NECK: no adenopathy, no asymmetry, masses, or scars and thyroid normal to palpation  RESP: lungs clear to auscultation - no rales, rhonchi or wheezes  CV: regular rate and rhythm, normal S1 S2, no S3 or S4, no murmur, click or rub, no peripheral edema and peripheral pulses strong  ABDOMEN: soft, nontender, no hepatosplenomegaly, no masses and bowel sounds normal  MS: no gross musculoskeletal defects noted, no edema  NEURO: Normal strength and tone, mentation intact and speech normal    Abdominal X-Ray and Ultrasound ordered.

## 2021-02-16 NOTE — PATIENT INSTRUCTIONS
- Abdominal X-Ray ordered. Head ultrasound ordered - [phone]  - Take probiotics daily.   - Start miralax twice per day to help with the constipation.   - Daily colace as a stool softener.       Patient Education     Eating a High-Fiber Diet  Fiber is what gives strength and structure to plants. Most grains, beans, vegetables, and fruits contain fiber. Foods rich in fiber are often low in calories and fat, and they fill you up more. They may also reduce your risks for certain health problems. To find out the amount of fiber in canned, packaged, or frozen foods, read the Nutrition Facts label. It tells you how much fiber is in one serving.     Types of fiber and their benefits  There are two types of fiber: insoluble and soluble. They both aid digestion and help you maintain a healthy weight.     Insoluble fiber. This is found in whole grains, cereals, certain fruits and vegetables such as apple skin, corn, and carrots. Insoluble fiber may prevent constipation and reduce the risk for certain types of cancer. It's called insoluble because it doesn't dissolve in water.    Soluble fiber. This type of fiber is in oats, beans, and certain fruits and vegetables such as strawberries and peas. Soluble fiber can reduce cholesterol, which may help lower the risk for heart disease. It also helps control blood sugar levels.  Look for high-fiber foods  Try these foods to add fiber to your diet:    Whole-grain breads and cereals. Try to eat 6 to 8 ounces a day. Include wheat and oat bran cereals, whole-wheat muffins or toast, and corn tortillas in your meals.    Fruits. Try to eat 2 cups a day. Apples, oranges, strawberries, pears, and bananas are good sources. (Note: Fruit juice is low in fiber.)    Vegetables. Try to eat at least 2.5 cups a day. Add asparagus, carrots, broccoli, peas, and corn to your meals.    Beans. One cup of cooked lentils gives you over 15 grams of fiber. Try navy beans, lentils, and  chickpeas.    Seeds. A small handful of seeds gives you about 3 grams of fiber. Try sunflower or delbert seeds.  Keep track of your fiber  Keep track of how much fiber you eat. Start by reading food labels. Then eat a variety of foods high in fiber. As you start to eat more fiber, ask your healthcare provider how much water you should be drinking to keep your digestive system working smoothly.   Aim for a certain amount of fiber in your diet each day. The daily value for fiber is 25 grams a day. But some experts advise that women under 50 eat 25 to 28 grams per day and that men under 50 eat 30 to 38 grams per day. After age 50, your daily fiber needs drop to 22 grams for women and 28 grams for men.   Before you reach for the fiber supplements, think about this. Fiber is found naturally in healthy whole foods. It gives you that feeling of fullness after you eat. Taking fiber supplements or eating fiber-enriched foods will not give you this full feeling.   Your fiber intake is a good measure for the quality of your overall diet. If you are missing out on your daily amount of fiber, you may be lacking other important nutrients as well.   [employer] last reviewed this educational content on 7/1/2019 2000-2020 [employer], [place]. [address] [place] [address], [place] [address]. All rights reserved. This information is not intended as a substitute for professional medical care. Always follow your healthcare professional's instructions.

## 2021-02-23 ENCOUNTER — ANCILLARY PROCEDURE (OUTPATIENT)
Dept: ULTRASOUND IMAGING | Facility: CLINIC | Age: 26
End: 2021-02-23
Attending: NURSE PRACTITIONER
Payer: COMMERCIAL

## 2021-02-23 DIAGNOSIS — D36.9 BENIGN NEOPLASM: ICD-10-CM

## 2021-02-23 PROCEDURE — 76536 US EXAM OF HEAD AND NECK: CPT | Mod: GC | Performed by: RADIOLOGY

## 2021-06-01 VITALS — BODY MASS INDEX: 20.75 KG/M2 | HEIGHT: 68 IN | WEIGHT: 136.9 LBS

## 2021-06-01 VITALS — WEIGHT: 138.56 LBS | BODY MASS INDEX: 21.07 KG/M2

## 2021-06-16 NOTE — PROGRESS NOTES
ASSESSMENT:  1. Preventative health care  Healthy young man.  Update labs  - Vitamin D, Total (25-Hydroxy)  - Lipid Cascade    2. Chronic idiopathic constipation  Subacute lasting 2 weeks.  I do not think imaging is necessary at this time.  Empiric trial of MiraLAX.  Check labs.  Adjust based on response and lab testing.  May need imaging or direct visualization based on progression  - Comprehensive Metabolic Panel  - HM2(CBC w/o Differential)  - Thyroid Stimulating Hormone (TSH)      PLAN:  Patient Instructions   Blood sugar for diabetes, cholesterol, vitamin D in [place]    Constipated--check for thyroid, kidney, liver, hemoglobin, white count, platelets    Best medication for constipation is Miralax, a powder over the counter.  This helps for a bowel that is sluggish, and moves slowly    Most common reason for bleeding is hemorrhoids or a rectal fissure.  A rectal fissure would hurt, and yours did not, so this is probably hemorrhoids    Fiber like Metamucil helps if you dont have enough water or fiber    If we cannot figure this out with blood tests and trying some meds, the next step is to look inside.  Barium enema, or a colonoscopy              Orders Placed This Encounter   Procedures     Comprehensive Metabolic Panel     HM2(CBC w/o Differential)     Thyroid Stimulating Hormone (TSH)     Vitamin D, Total (25-Hydroxy)     Lipid Cascade     Order Specific Question:   Fasting is required?     Answer:   Unknown     There are no discontinued medications.    No Follow-up on file.    CHIEF COMPLAINT:  Chief Complaint   Patient presents with     Annual Exam     Constipation     having constipation issues for the past two weeks, was seen at [facility] clinics        HISTORY OF PRESENT ILLNESS:  Dayami is a 22 y.o. male presenting to the clinic today to address his constipation.     Constipation: His mother told him to drink tamarind. This results in a liquid stool. He has been drinking this for the past two weeks. He  "was never constipated before this. He does not take any medications. He had rectal bleeding briefly, and constipation started a week later. After eating food his friends prepared, his constipation immediately began. He does not have pain with constipation    GERD: he used to take a medication for GERD.     REVIEW OF SYSTEMS:   All other systems are negative.    PFSH:  Justine;: He studies at [employer]: He has surgery for a meniscus tear of the left knee    Family: He reports his parents are health and without chronic health conditions.     TOBACCO USE:  History   Smoking Status     Former Smoker   Smokeless Tobacco     Never Used       VITALS:  Vitals:    03/07/18 1449   BP: 118/66   Patient Site: Left Arm   Patient Position: Sitting   Cuff Size: Adult Regular   Pulse: 70   Weight: 136 lb 14.4 oz (62.1 kg)   Height: 5' 8\" (1.727 m)     Wt Readings from Last 3 Encounters:   03/07/18 136 lb 14.4 oz (62.1 kg)     Body mass index is 20.82 kg/(m^2).    PHYSICAL EXAM:  Constitutional:  Reveals an alert, slim young man.  Vitals:  Per nursing notes.  Cardiac:  Regular rate and rhythm without murmurs, rubs, or gallops. Carotids without bruits. Legs without edema. Palpation of the radial pulse regular.  Lungs: Clear.  Respiratory effort normal.  Rheumatologic: Normal joints and nails of the hands.  Neurologic:  Cranial nerves II-XII intact.     Psychiatric:  Mood appropriate, memory intact.     QUALITY MEASURES:    ADDITIONAL HISTORY SUMMARIZED (2): Reviewed patient family, medical, social history provided as a written document. Reviewed note from [place] colon and rectal on 02/22/2018, constipation.   DECISION TO OBTAIN EXTRA INFORMATION (1): Accessed care everywhere.   RADIOLOGY TESTS (1): None  LABS (1): Reviewed, ordered labs, lipids, vitamin D  MEDICINE TESTS (1): None  INDEPENDENT REVIEW (2 each): None    Total data points: 4    The visit lasted a total of 22 minutes face to face with the patient. Over 50% of " the time was spent counseling and educating the patient about constipation.    I, Gala Curiel, am scribing for and in the presence of, Dr. Fraga.    I, Dr. Fraga, personally performed the services described in this documentation, as scribed by Gala Curiel in my presence, and it is both accurate and complete.    MEDICATIONS:  Current Outpatient Prescriptions   Medication Sig Dispense Refill     polyethylene glycol (GLYCOLAX) 17 gram/dose powder Take 17 g by mouth daily. 255 g 2     No current facility-administered medications for this visit.

## 2021-06-18 NOTE — PROGRESS NOTES
ASSESSMENT AND PLAN:    1. Knee pain  Unclear etiology.  Exam is negative.  History of arthroscopic meniscectomy.  Avid player of soccer, including upcoming in [place].  Will refer to orthopedic surgery for opinion about possibly abnormality.   - Ambulatory referral to Orthopedic Surgery    CHIEF COMPLAINT:  Chief Complaint   Patient presents with     Knee Pain     left knee x 6 weeks     HISTORY OF PRESENT ILLNESS:  Dayami Clemons is a 22 y.o. male with episodic pain in the left knee.  No acute knee swelling or laxity.  Plays soccer avidly and has no symptoms.  Saw a chiropractor recently who said he may have ACL laxity.  He has not felt any problems, but is concerned as he is due to go to [place] to play soccer this summer.      REVIEW OF SYSTEMS:   See HPI, all other pertinent systems on review are negative.    Active Ambulatory Problems     Diagnosis Date Noted     No Active Ambulatory Problems     Resolved Ambulatory Problems     Diagnosis Date Noted     No Resolved Ambulatory Problems     No Additional Past Medical History     Past Surgical History:   Procedure Laterality Date     KNEE ARTHROSCOPY W/ PARTIAL MEDIAL MENISCECTOMY       VITALS:  Vitals:    05/24/18 0908   BP: 98/66   Patient Site: Left Arm   Patient Position: Sitting   Cuff Size: Adult Regular   Pulse: 68   SpO2: 99%   Weight: 138 lb 9 oz (62.9 kg)     Wt Readings from Last 3 Encounters:   05/24/18 138 lb 9 oz (62.9 kg)   03/07/18 136 lb 14.4 oz (62.1 kg)     PHYSICAL EXAM:  Constitutional:  Well appearing in NAD, alert and oriented  Extremities: no effusion, full ROM, no laxity is noted.     Paul Sierra MD  Internal Medicine  [facility]  
Yes

## 2021-08-14 ENCOUNTER — HEALTH MAINTENANCE LETTER (OUTPATIENT)
Age: 26
End: 2021-08-14

## 2021-10-10 ENCOUNTER — HEALTH MAINTENANCE LETTER (OUTPATIENT)
Age: 26
End: 2021-10-10

## 2022-09-18 ENCOUNTER — HEALTH MAINTENANCE LETTER (OUTPATIENT)
Age: 27
End: 2022-09-18

## 2023-10-08 ENCOUNTER — HEALTH MAINTENANCE LETTER (OUTPATIENT)
Age: 28
End: 2023-10-08